# Patient Record
Sex: MALE | Race: WHITE | Employment: FULL TIME | URBAN - METROPOLITAN AREA
[De-identification: names, ages, dates, MRNs, and addresses within clinical notes are randomized per-mention and may not be internally consistent; named-entity substitution may affect disease eponyms.]

---

## 2017-08-29 ENCOUNTER — HOSPITAL ENCOUNTER (EMERGENCY)
Age: 37
Discharge: HOME | End: 2017-08-29
Attending: EMERGENCY MEDICINE
Payer: COMMERCIAL

## 2017-08-29 ENCOUNTER — APPOINTMENT (OUTPATIENT)
Dept: CT IMAGING | Age: 37
End: 2017-08-29
Attending: EMERGENCY MEDICINE
Payer: COMMERCIAL

## 2017-08-29 VITALS
HEIGHT: 68 IN | RESPIRATION RATE: 20 BRPM | TEMPERATURE: 99 F | HEART RATE: 111 BPM | SYSTOLIC BLOOD PRESSURE: 141 MMHG | WEIGHT: 240 LBS | DIASTOLIC BLOOD PRESSURE: 98 MMHG | BODY MASS INDEX: 36.37 KG/M2 | OXYGEN SATURATION: 96 %

## 2017-08-29 DIAGNOSIS — K57.92 ACUTE DIVERTICULITIS: Primary | ICD-10-CM

## 2017-08-29 LAB
ALBUMIN SERPL-MCNC: 3.8 G/DL (ref 3.5–4.8)
ALP LIVER SERPL-CCNC: 84 U/L (ref 45–117)
ALT SERPL-CCNC: 85 U/L (ref 17–63)
AST SERPL-CCNC: 56 U/L (ref 15–41)
BASOPHILS # BLD AUTO: 0.04 X10(3) UL (ref 0–0.1)
BASOPHILS NFR BLD AUTO: 0.3 %
BILIRUB SERPL-MCNC: 1.7 MG/DL (ref 0.1–2)
BILIRUB UR QL STRIP.AUTO: NEGATIVE
BUN BLD-MCNC: 16 MG/DL (ref 8–20)
CALCIUM BLD-MCNC: 8.7 MG/DL (ref 8.3–10.3)
CHLORIDE: 108 MMOL/L (ref 101–111)
CLARITY UR REFRACT.AUTO: CLEAR
CO2: 24 MMOL/L (ref 22–32)
COLOR UR AUTO: YELLOW
CREAT BLD-MCNC: 0.97 MG/DL (ref 0.7–1.3)
EOSINOPHIL # BLD AUTO: 0.1 X10(3) UL (ref 0–0.3)
EOSINOPHIL NFR BLD AUTO: 0.8 %
ERYTHROCYTE [DISTWIDTH] IN BLOOD BY AUTOMATED COUNT: 13.3 % (ref 11.5–16)
GLUCOSE BLD-MCNC: 104 MG/DL (ref 70–99)
GLUCOSE UR STRIP.AUTO-MCNC: NEGATIVE MG/DL
HCT VFR BLD AUTO: 46.8 % (ref 37–53)
HGB BLD-MCNC: 15.6 G/DL (ref 13–17)
IMMATURE GRANULOCYTE COUNT: 0.06 X10(3) UL (ref 0–1)
IMMATURE GRANULOCYTE RATIO %: 0.5 %
KETONES UR STRIP.AUTO-MCNC: NEGATIVE MG/DL
LEUKOCYTE ESTERASE UR QL STRIP.AUTO: NEGATIVE
LIPASE: 166 U/L (ref 73–393)
LYMPHOCYTES # BLD AUTO: 2.46 X10(3) UL (ref 0.9–4)
LYMPHOCYTES NFR BLD AUTO: 18.5 %
M PROTEIN MFR SERPL ELPH: 7.7 G/DL (ref 6.1–8.3)
MCH RBC QN AUTO: 29.1 PG (ref 27–33.2)
MCHC RBC AUTO-ENTMCNC: 33.3 G/DL (ref 31–37)
MCV RBC AUTO: 87.2 FL (ref 80–99)
MONOCYTES # BLD AUTO: 1.4 X10(3) UL (ref 0.1–0.6)
MONOCYTES NFR BLD AUTO: 10.5 %
NEUTROPHIL ABS PRELIM: 9.25 X10 (3) UL (ref 1.3–6.7)
NEUTROPHILS # BLD AUTO: 9.25 X10(3) UL (ref 1.3–6.7)
NEUTROPHILS NFR BLD AUTO: 69.4 %
NITRITE UR QL STRIP.AUTO: NEGATIVE
PH UR STRIP.AUTO: 6.5 [PH] (ref 4.5–8)
PLATELET # BLD AUTO: 251 10(3)UL (ref 150–450)
POTASSIUM SERPL-SCNC: 4.1 MMOL/L (ref 3.6–5.1)
PROT UR STRIP.AUTO-MCNC: NEGATIVE MG/DL
RBC # BLD AUTO: 5.37 X10(6)UL (ref 4.3–5.7)
RBC UR QL AUTO: NEGATIVE
RED CELL DISTRIBUTION WIDTH-SD: 42.5 FL (ref 35.1–46.3)
SODIUM SERPL-SCNC: 140 MMOL/L (ref 136–144)
SP GR UR STRIP.AUTO: 1.02 (ref 1–1.03)
UROBILINOGEN UR STRIP.AUTO-MCNC: 2 MG/DL
WBC # BLD AUTO: 13.3 X10(3) UL (ref 4–13)

## 2017-08-29 PROCEDURE — 81003 URINALYSIS AUTO W/O SCOPE: CPT | Performed by: EMERGENCY MEDICINE

## 2017-08-29 PROCEDURE — 96374 THER/PROPH/DIAG INJ IV PUSH: CPT

## 2017-08-29 PROCEDURE — 99285 EMERGENCY DEPT VISIT HI MDM: CPT

## 2017-08-29 PROCEDURE — 85025 COMPLETE CBC W/AUTO DIFF WBC: CPT | Performed by: EMERGENCY MEDICINE

## 2017-08-29 PROCEDURE — 96375 TX/PRO/DX INJ NEW DRUG ADDON: CPT

## 2017-08-29 PROCEDURE — 80053 COMPREHEN METABOLIC PANEL: CPT | Performed by: EMERGENCY MEDICINE

## 2017-08-29 PROCEDURE — 99284 EMERGENCY DEPT VISIT MOD MDM: CPT

## 2017-08-29 PROCEDURE — 96361 HYDRATE IV INFUSION ADD-ON: CPT

## 2017-08-29 PROCEDURE — 74177 CT ABD & PELVIS W/CONTRAST: CPT | Performed by: EMERGENCY MEDICINE

## 2017-08-29 PROCEDURE — 83690 ASSAY OF LIPASE: CPT | Performed by: EMERGENCY MEDICINE

## 2017-08-29 RX ORDER — CIPROFLOXACIN 500 MG/1
500 TABLET, FILM COATED ORAL 2 TIMES DAILY
Qty: 20 TABLET | Refills: 0 | Status: SHIPPED | OUTPATIENT
Start: 2017-08-29 | End: 2017-09-08

## 2017-08-29 RX ORDER — HYDROMORPHONE HYDROCHLORIDE 1 MG/ML
1 INJECTION, SOLUTION INTRAMUSCULAR; INTRAVENOUS; SUBCUTANEOUS EVERY 30 MIN PRN
Status: DISCONTINUED | OUTPATIENT
Start: 2017-08-29 | End: 2017-08-29

## 2017-08-29 RX ORDER — ONDANSETRON 2 MG/ML
4 INJECTION INTRAMUSCULAR; INTRAVENOUS ONCE
Status: COMPLETED | OUTPATIENT
Start: 2017-08-29 | End: 2017-08-29

## 2017-08-29 RX ORDER — METRONIDAZOLE 500 MG/1
500 TABLET ORAL 3 TIMES DAILY
Qty: 30 TABLET | Refills: 0 | Status: SHIPPED | OUTPATIENT
Start: 2017-08-29 | End: 2017-09-08

## 2017-08-29 RX ORDER — HYDROCODONE BITARTRATE AND ACETAMINOPHEN 5; 325 MG/1; MG/1
1-2 TABLET ORAL EVERY 8 HOURS PRN
Qty: 20 TABLET | Refills: 0 | Status: SHIPPED | OUTPATIENT
Start: 2017-08-29 | End: 2017-09-05

## 2017-08-29 NOTE — ED PROVIDER NOTES
Patient Seen in: THE [facility] Emergency Department In [place]    History   Patient presents with:  Abdomen/Flank Pain (GI/)    Stated Complaint: left flank pain    HPI    The patient is a 60-year-old male with a history of a single episode of diverticulit elements reviewed from today and agreed except as otherwise stated in HPI.     Physical Exam   ED Triage Vitals [08/29/17 0748]  BP: 149/100  Pulse: 110  Resp: 18  Temp: 98.5 °F (36.9 °C)  Temp src: Temporal  SpO2: 98 %  O2 Device: None (Room air)    Candi Abnormality         Status                     ---------                               -----------         ------                     CBC W/ DIFFERENTIAL[[id number]]          Abnormal            Final result                 Please view results for these thickening with pericolonic stranding seen focally involving the mid to distal descending colon. No free air. No focal fluid collection. ABDOMINAL WALL:  No hernia. URINARY BLADDER:  Nondistended. PELVIC NODES:  None enlarged.  PELVIC ORGANS:  Small amount Do not take with other Tylenol containing products. ).   Qty: 20 tablet Refills: 0

## 2017-09-21 ENCOUNTER — LAB ENCOUNTER (OUTPATIENT)
Dept: LAB | Age: 37
End: 2017-09-21
Attending: INTERNAL MEDICINE
Payer: COMMERCIAL

## 2017-09-21 DIAGNOSIS — R94.5 NONSPECIFIC ABNORMAL RESULTS OF LIVER FUNCTION STUDY: ICD-10-CM

## 2017-09-21 DIAGNOSIS — Z86.14 HISTORY OF MRSA INFECTION: ICD-10-CM

## 2017-09-21 LAB
ALPHA 1 ANTITRYPSIN SERUM: 135 MG/DL (ref 90–200)
DEPRECATED HBV CORE AB SER IA-ACNC: 91.8 NG/ML (ref 22–322)
HAV IGM SER QL: NONREACTIVE
HBV CORE IGM SER QL: NONREACTIVE
HBV SURFACE AB SER QL: NONREACTIVE
HBV SURFACE AB SERPL IA-ACNC: 9.21 MIU/ML
HBV SURFACE AG SERPL QL IA: NONREACTIVE
HEPATITIS C VIRUS AB INTERPRETATION: NONREACTIVE
IMMUNOGLOBULIN A: 225 MG/DL (ref 70–312)
IRON SATURATION: 15 % (ref 13–45)
IRON: 61 UG/DL (ref 45–182)
TOTAL IRON BINDING CAPACITY: 399 UG/DL (ref 298–536)
TRANSFERRIN: 268 MG/DL (ref 200–360)
TRIGLYCERIDES: 169 MG/DL (ref ?–150)
TSI SER-ACNC: 1.56 MIU/ML (ref 0.35–5.5)

## 2017-09-21 PROCEDURE — 82784 ASSAY IGA/IGD/IGG/IGM EACH: CPT

## 2017-09-21 PROCEDURE — 86038 ANTINUCLEAR ANTIBODIES: CPT

## 2017-09-21 PROCEDURE — 87081 CULTURE SCREEN ONLY: CPT

## 2017-09-21 PROCEDURE — 36415 COLL VENOUS BLD VENIPUNCTURE: CPT

## 2017-09-21 PROCEDURE — 86706 HEP B SURFACE ANTIBODY: CPT

## 2017-09-21 PROCEDURE — 83516 IMMUNOASSAY NONANTIBODY: CPT

## 2017-09-21 PROCEDURE — 86255 FLUORESCENT ANTIBODY SCREEN: CPT

## 2017-09-21 PROCEDURE — 83540 ASSAY OF IRON: CPT

## 2017-09-21 PROCEDURE — 84478 ASSAY OF TRIGLYCERIDES: CPT

## 2017-09-21 PROCEDURE — 82103 ALPHA-1-ANTITRYPSIN TOTAL: CPT

## 2017-09-21 PROCEDURE — 84443 ASSAY THYROID STIM HORMONE: CPT

## 2017-09-21 PROCEDURE — 82728 ASSAY OF FERRITIN: CPT

## 2017-09-21 PROCEDURE — 83550 IRON BINDING TEST: CPT

## 2017-09-21 PROCEDURE — 80074 ACUTE HEPATITIS PANEL: CPT

## 2017-09-22 ENCOUNTER — APPOINTMENT (OUTPATIENT)
Dept: LAB | Age: 37
End: 2017-09-22
Attending: INTERNAL MEDICINE
Payer: COMMERCIAL

## 2017-09-22 DIAGNOSIS — Z86.14 HISTORY OF MRSA INFECTION: ICD-10-CM

## 2017-09-22 LAB
ANA SCREEN: NEGATIVE
F-ACTIN (SMOOTH MUSCLE) AB: 7 UNITS
MITOCHONDRIAL M2 AB, IGG: 3.1 UNITS

## 2017-09-22 PROCEDURE — 87081 CULTURE SCREEN ONLY: CPT

## 2017-09-23 ENCOUNTER — APPOINTMENT (OUTPATIENT)
Dept: LAB | Age: 37
End: 2017-09-23
Attending: INTERNAL MEDICINE
Payer: COMMERCIAL

## 2017-09-23 DIAGNOSIS — Z86.14 HISTORY OF MRSA INFECTION: ICD-10-CM

## 2017-09-23 PROCEDURE — 87081 CULTURE SCREEN ONLY: CPT

## 2017-09-25 LAB
GLIAD (DEAMIDATED) AB, IGA: NEGATIVE
GLIAD (DEAMIDATED) AB, IGG: NEGATIVE

## 2017-09-26 LAB — TISSUE TRANSGLUTAMINASE IGA QUALITATIVE: NEGATIVE

## 2018-05-11 ENCOUNTER — EKG ENCOUNTER (OUTPATIENT)
Dept: LAB | Age: 38
End: 2018-05-11
Attending: PLASTIC SURGERY
Payer: COMMERCIAL

## 2018-05-11 PROCEDURE — 93010 ELECTROCARDIOGRAM REPORT: CPT | Performed by: INTERNAL MEDICINE

## 2018-05-11 PROCEDURE — 93005 ELECTROCARDIOGRAM TRACING: CPT

## (undated) NOTE — ED AVS SNAPSHOT
Wilmer Baker   MRN: [id number]    Department:  [facility] Emergency [facility] in [place]   Date of Visit:  8/29/2017           Disclosure     Insurance plans vary and the physician(s) referred by the ER may not be covered by your plan.  Please contact y If you have been prescribed any medication(s), please fill your prescription right away and begin taking the medication(s) as directed    If the emergency physician has read X-rays, these will be re-interpreted by a radiologist.  If there is a significant